# Patient Record
Sex: FEMALE | Race: WHITE | HISPANIC OR LATINO | Employment: UNEMPLOYED | URBAN - METROPOLITAN AREA
[De-identification: names, ages, dates, MRNs, and addresses within clinical notes are randomized per-mention and may not be internally consistent; named-entity substitution may affect disease eponyms.]

---

## 2017-08-27 ENCOUNTER — HOSPITAL ENCOUNTER (EMERGENCY)
Facility: HOSPITAL | Age: 32
Discharge: HOME | End: 2017-08-27
Attending: EMERGENCY MEDICINE

## 2017-08-27 VITALS
HEIGHT: 62 IN | BODY MASS INDEX: 23.92 KG/M2 | WEIGHT: 130 LBS | TEMPERATURE: 98 F | OXYGEN SATURATION: 100 % | HEART RATE: 84 BPM | SYSTOLIC BLOOD PRESSURE: 93 MMHG | RESPIRATION RATE: 14 BRPM | DIASTOLIC BLOOD PRESSURE: 62 MMHG

## 2017-08-27 DIAGNOSIS — L02.419 AXILLARY ABSCESS: Primary | ICD-10-CM

## 2017-08-27 LAB
B-HCG UR QL: NEGATIVE
CTP QC/QA: YES

## 2017-08-27 PROCEDURE — 10060 I&D ABSCESS SIMPLE/SINGLE: CPT

## 2017-08-27 PROCEDURE — 81025 URINE PREGNANCY TEST: CPT | Performed by: NURSE PRACTITIONER

## 2017-08-27 PROCEDURE — 25000003 PHARM REV CODE 250: Performed by: PHYSICIAN ASSISTANT

## 2017-08-27 PROCEDURE — 99283 EMERGENCY DEPT VISIT LOW MDM: CPT | Mod: 25

## 2017-08-27 RX ORDER — CLINDAMYCIN HYDROCHLORIDE 150 MG/1
300 CAPSULE ORAL 4 TIMES DAILY
Qty: 56 CAPSULE | Refills: 0 | Status: SHIPPED | OUTPATIENT
Start: 2017-08-27 | End: 2017-08-30 | Stop reason: ALTCHOICE

## 2017-08-27 RX ORDER — IBUPROFEN 600 MG/1
600 TABLET ORAL EVERY 6 HOURS PRN
Qty: 20 TABLET | Refills: 0 | Status: SHIPPED | OUTPATIENT
Start: 2017-08-27 | End: 2017-09-01

## 2017-08-27 RX ORDER — CLINDAMYCIN HYDROCHLORIDE 150 MG/1
300 CAPSULE ORAL
Status: COMPLETED | OUTPATIENT
Start: 2017-08-27 | End: 2017-08-27

## 2017-08-27 RX ORDER — LIDOCAINE HYDROCHLORIDE 10 MG/ML
10 INJECTION INFILTRATION; PERINEURAL
Status: COMPLETED | OUTPATIENT
Start: 2017-08-27 | End: 2017-08-27

## 2017-08-27 RX ADMIN — CLINDAMYCIN HYDROCHLORIDE 300 MG: 150 CAPSULE ORAL at 06:08

## 2017-08-27 RX ADMIN — LIDOCAINE HYDROCHLORIDE 10 ML: 10 INJECTION, SOLUTION INFILTRATION; PERINEURAL at 06:08

## 2017-08-27 NOTE — ED TRIAGE NOTES
Pt with redness, swelling to right axillary x 3 days. No active drainage. Pt denies fever. Pt's spouse at bedside with translation.

## 2017-08-27 NOTE — ED PROVIDER NOTES
Encounter Date: 8/27/2017    SCRIBE #1 NOTE: I, Judit Collins, am scribing for, and in the presence of,  Zhanna Taylor PA-C. I have scribed the following portions of the note - Other sections scribed: HPI/ROS.       History     Chief Complaint   Patient presents with    Abscess     right axilla x 4 days     CC: Abscess    HPI: This 32 y.o. Female with no pertinent medical history presents to the ED acute, severe (10/10) abscess to the right axilla for 3 days. Abscess has become larger in size and more painful since onset. No attempted tx. No alleviating or exacerbating factors. Patient denies drainage from abscess, fever, chills, nausea, vomiting, spreading redness or swelling.       The history is provided by the patient. A  was used (relative).     Review of patient's allergies indicates:   Allergen Reactions    Bactrim [sulfamethoxazole-trimethoprim] Rash     History reviewed. No pertinent past medical history.  History reviewed. No pertinent surgical history.  History reviewed. No pertinent family history.  Social History   Substance Use Topics    Smoking status: Never Smoker    Smokeless tobacco: Never Used    Alcohol use No     Review of Systems   Constitutional: Negative for chills and fever.   HENT: Negative for congestion, ear pain, rhinorrhea and sore throat.    Eyes: Negative for pain and visual disturbance.   Respiratory: Negative for cough and shortness of breath.    Cardiovascular: Negative for chest pain.   Gastrointestinal: Negative for abdominal pain, diarrhea, nausea and vomiting.   Genitourinary: Negative for dysuria.   Musculoskeletal: Negative for back pain and neck pain.   Skin: Negative for rash.        (+) Abscess   Neurological: Negative for weakness, numbness and headaches.       Physical Exam     Initial Vitals [08/27/17 1750]   BP Pulse Resp Temp SpO2   127/64 86 20 98.5 °F (36.9 °C) 100 %      MAP       85         Physical Exam    Constitutional: She  appears well-developed and well-nourished. No distress.   HENT:   Head: Normocephalic.   Right Ear: External ear normal.   Left Ear: External ear normal.   Cardiovascular: Normal rate and regular rhythm. Exam reveals no gallop and no friction rub.    No murmur heard.  Pulmonary/Chest: Breath sounds normal. No respiratory distress. She has no wheezes. She has no rhonchi. She has no rales.   Skin: Skin is warm and dry.   2 cm fluctuant abscess to R axilla. No surrounding induration, spreading erythema or edema.    Psychiatric: She has a normal mood and affect.         ED Course   I & D - Incision and Drainage  Date/Time: 8/28/2017 1:16 AM  Location procedure was performed: [facility] EMERGENCY DEPARTMENT  Performed by: LILIBETH HINDS  Authorized by: KATARZYNA BAUTISTA   Pre-operative diagnosis: right axillary abscess  Post-operative diagnosis: right axillary abscess  Consent Done: Yes  Type: abscess  Location: right axilla.  Anesthesia: local infiltration    Anesthesia:  Local Anesthetic: lidocaine 1% without epinephrine  Anesthetic total: 10 mL  Description of findings: 2 cm fluctuant R axillary abscess   Scalpel size: 10  Incision type: single straight  Complexity: simple  Drainage: purulent and  bloody  Drainage amount: scant  Wound treatment: incision,  drainage and  wound packed  Packing material: 1/4 in iodoform gauze  Complications: No  Estimated blood loss (mL): 5  Specimens: No  Implants: No  Patient tolerance: Patient tolerated the procedure well with no immediate complications        Labs Reviewed   POCT URINE PREGNANCY             Medical Decision Making:   Initial Assessment:   Patient is a 32-year-old female who presents with a 3 day history of right axillary abscess.  Patient reports history of 1 abscess in the past to left axilla.  Patient denies fever, chills, nausea, vomiting.  On exam, there is 2 cm abscess right axilla.  I&D performed per procedure note.  Patient given first dose of clindamycin  in the ED.  Considered but doubt cyst, cellulitis, folliculitis. Discharged patient home with clindamycin and instructions to follow-up with either PCP or this department in 2 days for wound check.  ER return precautions discussed including worsening symptoms, fever, worsening pain, redness, swelling or as needed.  I discussed this patient with Dr. Fuentes who agrees with assessment and plan.             Scribe Attestation:   Scribe #1: I performed the above scribed service and the documentation accurately describes the services I performed. I attest to the accuracy of the note.    Attending Attestation:     Physician Attestation Statement for NP/PA:   I discussed this assessment and plan of this patient with the NP/PA, but I did not personally examine the patient. The face to face encounter was performed by the NP/PA.        Physician Attestation for Scribe:  Physician Attestation Statement for Scribe #1: I, Zhanna Taylor PA-C, reviewed documentation, as scribed by Judit Collins in my presence, and it is both accurate and complete.                 ED Course     Clinical Impression:   The encounter diagnosis was Axillary abscess.    Disposition:   Disposition: Discharged  Condition: Stable                        Austen Fuentes MD  08/27/17 5599       hZanna Taylor PA-C  08/28/17 0119       Austen Fuentes MD  09/01/17 1743

## 2017-08-28 NOTE — DISCHARGE INSTRUCTIONS
Please keep the wound clean and dry and covered with dressing.     Follow attached instructions.   Take full course of Clindamycin (antibiotic) and Ibuprofen as needed for pain.     Follow up in 2 days with your primary care or this ER for wound recheck.     Return to ER sooner if you develop worsening symptoms, fever, spreading redness or swelling or as needed.

## 2017-08-30 ENCOUNTER — HOSPITAL ENCOUNTER (EMERGENCY)
Facility: HOSPITAL | Age: 32
Discharge: HOME | End: 2017-08-30

## 2017-08-30 VITALS
DIASTOLIC BLOOD PRESSURE: 56 MMHG | TEMPERATURE: 98 F | HEART RATE: 90 BPM | SYSTOLIC BLOOD PRESSURE: 115 MMHG | RESPIRATION RATE: 20 BRPM | OXYGEN SATURATION: 100 % | HEIGHT: 62 IN | BODY MASS INDEX: 23.92 KG/M2 | WEIGHT: 130 LBS

## 2017-08-30 DIAGNOSIS — Z51.89 WOUND CHECK, ABSCESS: Primary | ICD-10-CM

## 2017-08-30 PROCEDURE — 99283 EMERGENCY DEPT VISIT LOW MDM: CPT

## 2017-08-30 RX ORDER — DOXYCYCLINE 100 MG/1
100 CAPSULE ORAL 2 TIMES DAILY
Qty: 20 CAPSULE | Refills: 0 | Status: SHIPPED | OUTPATIENT
Start: 2017-08-30 | End: 2017-09-09

## 2017-08-30 NOTE — ED TRIAGE NOTES
Pt presents to the ED w/ family. The patient came to the ED today for a packing removal of an abscess under her right arm. Patient reports redness and tenderness under right arm near the site that started after discharge.

## 2017-08-30 NOTE — ED PROVIDER NOTES
Encounter Date: 8/30/2017       History     Chief Complaint   Patient presents with    Packing Removal Abscess     abscess to right axilla area; packed 3 days ago     33 y/o female with no medical Hx c/o right axillary pain from abscess with recent I&D 3 days ago, also presents for packing removal. She has mild pain to the incision site and notes increasing pink area to her right arm. No significant drainage or bleeding from the site. She denies fever. She was rx clindamycin, which she has been compliant with.           Review of patient's allergies indicates:   Allergen Reactions    Bactrim [sulfamethoxazole-trimethoprim] Rash     No past medical history on file.  No past surgical history on file.  History reviewed. No pertinent family history.  Social History   Substance Use Topics    Smoking status: Never Smoker    Smokeless tobacco: Never Used    Alcohol use No     Review of Systems   Constitutional: Negative for fatigue and fever.   HENT: Negative for sore throat.    Respiratory: Negative for shortness of breath.    Cardiovascular: Negative for chest pain.   Gastrointestinal: Negative for nausea.   Genitourinary: Negative for dysuria.   Musculoskeletal: Negative for arthralgias, back pain, neck pain and neck stiffness.   Skin: Positive for color change and wound. Negative for rash.   Neurological: Negative for dizziness, weakness and light-headedness.   Hematological: Negative for adenopathy. Does not bruise/bleed easily.       Physical Exam     Initial Vitals [08/30/17 1703]   BP Pulse Resp Temp SpO2   (!) 115/56 90 20 98.2 °F (36.8 °C) 100 %      MAP       75.67         Physical Exam    Vitals reviewed.  Constitutional: She appears well-developed and well-nourished. She is not diaphoretic. No distress.   HENT:   Head: Normocephalic and atraumatic.   Right Ear: External ear normal.   Left Ear: External ear normal.   Nose: Nose normal.   Eyes: Conjunctivae are normal. No scleral icterus.   Neck: Normal  range of motion. Neck supple.   Cardiovascular: Normal rate, regular rhythm, normal heart sounds and intact distal pulses.   Pulmonary/Chest: Breath sounds normal. No respiratory distress. She has no wheezes. She has no rhonchi. She has no rales. She exhibits no tenderness.   Musculoskeletal: Normal range of motion.   Neurological: She is alert and oriented to person, place, and time.   Skin: Skin is warm and dry. Abscess noted. There is erythema.   Right axillary incision with packing in place with no erythema immediately surrounding the site. There is mild erythema extending distally from the axilla to the middle of the brachial region. No induration, drainage, or bleeding.          ED Course   Procedures  Labs Reviewed - No data to display          Medical Decision Making:   Initial Assessment:   31 y/o female with recent I&D of right axillary abscess presents for wound check and packing removal. She denies fever, but c/o pain extending to her arm. She is taking clindamycin. She presents with VS within nl limits. The wound appears clean and without erythema or significant induration. There is lymphangitis extending from the right axilla to the medial brachial aspect without induration.   ED Management:  Packing removed without complication and wound dressed. I suspect this may be MRSA and resistant to clindamycin so I will switch her to doxycycline for better coverage. I do not think she needs repacking. She is not septic. She does not require IV antibiotics or admit. Pt discharged with wound care instructions and return precautions. She verbalized understanding and agreed with plan. Case discussed with Dr. Gordon.                    ED Course     Clinical Impression:   The encounter diagnosis was Wound check, abscess.                           Jose Martin Borden PA-C  08/30/17 1658

## 2018-05-02 ENCOUNTER — HOSPITAL ENCOUNTER (EMERGENCY)
Facility: HOSPITAL | Age: 33
Discharge: HOME | End: 2018-05-02
Attending: EMERGENCY MEDICINE

## 2018-05-02 VITALS
RESPIRATION RATE: 16 BRPM | TEMPERATURE: 99 F | DIASTOLIC BLOOD PRESSURE: 61 MMHG | SYSTOLIC BLOOD PRESSURE: 102 MMHG | BODY MASS INDEX: 23.55 KG/M2 | WEIGHT: 128 LBS | HEIGHT: 62 IN | HEART RATE: 74 BPM | OXYGEN SATURATION: 100 %

## 2018-05-02 DIAGNOSIS — R11.2 NON-INTRACTABLE VOMITING WITH NAUSEA, UNSPECIFIED VOMITING TYPE: ICD-10-CM

## 2018-05-02 DIAGNOSIS — R10.84 GENERALIZED ABDOMINAL PAIN: Primary | ICD-10-CM

## 2018-05-02 LAB
ALBUMIN SERPL BCP-MCNC: 3.8 G/DL
ALP SERPL-CCNC: 58 U/L
ALT SERPL W/O P-5'-P-CCNC: 10 U/L
AMORPH CRY URNS QL MICRO: NORMAL
ANION GAP SERPL CALC-SCNC: 6 MMOL/L
AST SERPL-CCNC: 13 U/L
B-HCG UR QL: NEGATIVE
BASOPHILS # BLD AUTO: 0.03 K/UL
BASOPHILS NFR BLD: 0.4 %
BILIRUB SERPL-MCNC: 0.7 MG/DL
BILIRUB UR QL STRIP: NEGATIVE
BUN SERPL-MCNC: 8 MG/DL
CALCIUM SERPL-MCNC: 8.9 MG/DL
CHLORIDE SERPL-SCNC: 105 MMOL/L
CLARITY UR: ABNORMAL
CO2 SERPL-SCNC: 25 MMOL/L
COLOR UR: YELLOW
CREAT SERPL-MCNC: 0.8 MG/DL
CTP QC/QA: YES
DIFFERENTIAL METHOD: NORMAL
EOSINOPHIL # BLD AUTO: 0 K/UL
EOSINOPHIL NFR BLD: 0.1 %
ERYTHROCYTE [DISTWIDTH] IN BLOOD BY AUTOMATED COUNT: 13.1 %
EST. GFR  (AFRICAN AMERICAN): >60 ML/MIN/1.73 M^2
EST. GFR  (NON AFRICAN AMERICAN): >60 ML/MIN/1.73 M^2
GLUCOSE SERPL-MCNC: 108 MG/DL
GLUCOSE UR QL STRIP: NEGATIVE
HCT VFR BLD AUTO: 38.8 %
HGB BLD-MCNC: 13 G/DL
HGB UR QL STRIP: NEGATIVE
KETONES UR QL STRIP: ABNORMAL
LEUKOCYTE ESTERASE UR QL STRIP: NEGATIVE
LIPASE SERPL-CCNC: 18 U/L
LYMPHOCYTES # BLD AUTO: 1.6 K/UL
LYMPHOCYTES NFR BLD: 19.9 %
MCH RBC QN AUTO: 29.3 PG
MCHC RBC AUTO-ENTMCNC: 33.5 G/DL
MCV RBC AUTO: 87 FL
MICROSCOPIC COMMENT: NORMAL
MONOCYTES # BLD AUTO: 0.9 K/UL
MONOCYTES NFR BLD: 11.4 %
NEUTROPHILS # BLD AUTO: 5.5 K/UL
NEUTROPHILS NFR BLD: 68.2 %
NITRITE UR QL STRIP: NEGATIVE
PH UR STRIP: 6 [PH] (ref 5–8)
PLATELET # BLD AUTO: 214 K/UL
PMV BLD AUTO: 11 FL
POTASSIUM SERPL-SCNC: 4 MMOL/L
PROT SERPL-MCNC: 6.9 G/DL
PROT UR QL STRIP: NEGATIVE
RBC # BLD AUTO: 4.44 M/UL
RBC #/AREA URNS HPF: 1 /HPF (ref 0–4)
SODIUM SERPL-SCNC: 136 MMOL/L
SP GR UR STRIP: 1.02 (ref 1–1.03)
SQUAMOUS #/AREA URNS HPF: 3 /HPF
URN SPEC COLLECT METH UR: ABNORMAL
UROBILINOGEN UR STRIP-ACNC: NEGATIVE EU/DL
WBC # BLD AUTO: 8.09 K/UL

## 2018-05-02 PROCEDURE — 85025 COMPLETE CBC W/AUTO DIFF WBC: CPT

## 2018-05-02 PROCEDURE — 96365 THER/PROPH/DIAG IV INF INIT: CPT

## 2018-05-02 PROCEDURE — 25000003 PHARM REV CODE 250: Performed by: PHYSICIAN ASSISTANT

## 2018-05-02 PROCEDURE — 96361 HYDRATE IV INFUSION ADD-ON: CPT

## 2018-05-02 PROCEDURE — 81025 URINE PREGNANCY TEST: CPT | Performed by: EMERGENCY MEDICINE

## 2018-05-02 PROCEDURE — 81000 URINALYSIS NONAUTO W/SCOPE: CPT

## 2018-05-02 PROCEDURE — 63600175 PHARM REV CODE 636 W HCPCS: Performed by: PHYSICIAN ASSISTANT

## 2018-05-02 PROCEDURE — 83690 ASSAY OF LIPASE: CPT

## 2018-05-02 PROCEDURE — 80053 COMPREHEN METABOLIC PANEL: CPT

## 2018-05-02 PROCEDURE — 99283 EMERGENCY DEPT VISIT LOW MDM: CPT | Mod: 25

## 2018-05-02 RX ORDER — DICYCLOMINE HYDROCHLORIDE 10 MG/1
20 CAPSULE ORAL
Status: COMPLETED | OUTPATIENT
Start: 2018-05-02 | End: 2018-05-02

## 2018-05-02 RX ORDER — PROMETHAZINE HYDROCHLORIDE 25 MG/1
25 TABLET ORAL EVERY 6 HOURS PRN
Qty: 10 TABLET | Refills: 0 | Status: SHIPPED | OUTPATIENT
Start: 2018-05-02

## 2018-05-02 RX ORDER — DICYCLOMINE HYDROCHLORIDE 20 MG/1
20 TABLET ORAL 2 TIMES DAILY
Qty: 20 TABLET | Refills: 0 | Status: SHIPPED | OUTPATIENT
Start: 2018-05-02 | End: 2018-06-01

## 2018-05-02 RX ADMIN — SODIUM CHLORIDE 1000 ML: 0.9 INJECTION, SOLUTION INTRAVENOUS at 08:05

## 2018-05-02 RX ADMIN — SODIUM CHLORIDE 1000 ML: 0.9 INJECTION, SOLUTION INTRAVENOUS at 10:05

## 2018-05-02 RX ADMIN — PROMETHAZINE HYDROCHLORIDE 25 MG: 25 INJECTION INTRAMUSCULAR; INTRAVENOUS at 08:05

## 2018-05-02 RX ADMIN — DICYCLOMINE HYDROCHLORIDE 20 MG: 10 CAPSULE ORAL at 09:05

## 2018-05-03 NOTE — ED PROVIDER NOTES
"Encounter Date: 5/2/2018    SCRIBE #1 NOTE: I, Moy Ferraro, am scribing for, and in the presence of,  Alyse Dooley PA-C. I have scribed the following portions of the note - Other sections scribed: HPI, ROS.       History     Chief Complaint   Patient presents with    Abdominal Pain     "This this morning, she got a lot of stomach pain and dizziness. She feels the body shaking, and puking."     CC: Abdominal Pain    HPI: This 32 y.o. Female presents to the ED c/o a squeezing generalized abdominal pain which began at 1000 this morning. Pt reports nausea and vomiting 5x this morning. Pt reports eating a banana and drinking Gatorade this morning. Pt says her last normal BM was this morning. Pt denies diarrhea, blood in stool, hematemesis, dysuria, vaginal discharge & bleeding, chest pain, SOB, and fever. No prior tx reported. Pt says reports having acid reflux in the past. Pt denies medical problems and regular medications. Pt reports sulfa allergy. Pt denies eating raw foods recently.      The history is provided by the patient. A  was used.     Review of patient's allergies indicates:   Allergen Reactions    Zofran [ondansetron hcl (pf)] Swelling     Facial swelling     History reviewed. No pertinent past medical history.  History reviewed. No pertinent surgical history.  History reviewed. No pertinent family history.  Social History   Substance Use Topics    Smoking status: Never Smoker    Smokeless tobacco: Never Used    Alcohol use No     Review of Systems   Constitutional: Negative for fever.   HENT: Negative for sore throat.    Respiratory: Negative for shortness of breath.    Cardiovascular: Negative for chest pain.   Gastrointestinal: Positive for abdominal pain, nausea and vomiting (5x). Negative for blood in stool and diarrhea.        (-) hematemesis   Genitourinary: Negative for dysuria, vaginal bleeding and vaginal discharge.   Musculoskeletal: Negative for back pain.   Skin: " Negative for rash.   Neurological: Negative for weakness.   Hematological: Does not bruise/bleed easily.       Physical Exam     Initial Vitals [05/02/18 1924]   BP Pulse Resp Temp SpO2   113/61 72 18 98.2 °F (36.8 °C) 100 %      MAP       78.33         Physical Exam    Nursing note and vitals reviewed.  Constitutional: Vital signs are normal. She appears well-developed and well-nourished. She is not diaphoretic. She is cooperative.  Non-toxic appearance. She does not have a sickly appearance. She does not appear ill. No distress.   HENT:   Head: Normocephalic and atraumatic.   Right Ear: Tympanic membrane, external ear and ear canal normal.   Left Ear: Tympanic membrane, external ear and ear canal normal.   Nose: Nose normal.   Mouth/Throat: Uvula is midline, oropharynx is clear and moist and mucous membranes are normal. No trismus in the jaw. No uvula swelling. No oropharyngeal exudate, posterior oropharyngeal edema or posterior oropharyngeal erythema.   Eyes: Conjunctivae, EOM and lids are normal. Pupils are equal, round, and reactive to light.   Neck: Trachea normal, normal range of motion, full passive range of motion without pain and phonation normal. Neck supple.   Cardiovascular: Normal rate, regular rhythm, normal heart sounds and intact distal pulses. Exam reveals no gallop and no friction rub.    No murmur heard.  Pulmonary/Chest: Effort normal and breath sounds normal. No respiratory distress. She has no decreased breath sounds. She has no wheezes. She has no rhonchi. She has no rales.   Abdominal: Soft. Normal appearance and bowel sounds are normal. She exhibits no distension and no mass. There is no tenderness. There is no rigidity, no rebound, no guarding, no CVA tenderness, no tenderness at McBurney's point and negative Kenney's sign.   Musculoskeletal: Normal range of motion.   Neurological: She is alert and oriented to person, place, and time. She has normal strength. No cranial nerve deficit or  sensory deficit. GCS eye subscore is 4. GCS verbal subscore is 5. GCS motor subscore is 6.   Skin: Skin is warm and dry. Capillary refill takes less than 2 seconds. No rash noted.   Psychiatric: She has a normal mood and affect. Her speech is normal and behavior is normal. Judgment and thought content normal. Cognition and memory are normal.         ED Course   Procedures  Labs Reviewed   URINALYSIS - Abnormal; Notable for the following:        Result Value    Appearance, UA Hazy (*)     Ketones, UA 2+ (*)     All other components within normal limits   COMPREHENSIVE METABOLIC PANEL - Abnormal; Notable for the following:     Anion Gap 6 (*)     All other components within normal limits   URINALYSIS MICROSCOPIC   CBC W/ AUTO DIFFERENTIAL   LIPASE   POCT URINE PREGNANCY             Medical Decision Making:   ED Management:  This is an emergent evaluation of a 32-year-old female who complains of abdominal pain. Patient reports generalized abdominal pain with associated emesis and dizziness that onset this morning around 10:00 a.m. She reports that the abdominal pain is intermittent and squeezing.  She denies any further symptoms. She reports drinking Gatorade and eating a banana today. She denies any attempted txs.     Physical Exam shows a non-toxic, afebrile, and well appearing female. Abdominal exam is benign. Normal skin tone and turgor.     Vital Signs Are Reassuring. If available, previous records reviewed.   RESULTS: UPT negative.    My overall impression is generalized abdominal pain, nausea and vomiting.   Ddx: abdominal pain, nausea, vomiting, gastroenteritis    ED Course: IV access gained, IV fluid bolus 1 L, Phenergan 25 mg IV. Upon reevaluation, patient reports still feeling weak, but denies nausea, emesis or abdominal pain. IV fluid bolus #2 given. Upon reevaluation, patient admits to relief and reports that she feels ready for discharge. Patient passed PO challenge. I feel this patient is stable for  discharge. D/C Meds:  Bentyl, Phenergan. Additional D/C Information:  I will recommend increase fluid intake, rest and close follow-up with primary care if symptoms continue. The diagnosis, treatment plan, instructions for follow-up and reevaluation with PCP, as well as ED return precautions were discussed and understanding was verbalized. All questions or concerns have been addressed. Patient was discharged home with an instructional sheet which gave not only information regarding the most likely diagnoses but also information regarding when to return to the emergency department for alarming symptoms and when to seek further care.        Other:   I have discussed this case with another health care provider.       <> Summary of the Discussion: This case was discussed with Dr. Contreras who is in agreement with my assessment and plan.             Scribe Attestation:   Scribe #1: I performed the above scribed service and the documentation accurately describes the services I performed. I attest to the accuracy of the note.    Attending Attestation:           Physician Attestation for Scribe:  Physician Attestation Statement for Scribe #1: I, Alyse Dooley PA-C, reviewed documentation, as scribed by Moy Ferraro in my presence, and it is both accurate and complete.                    Clinical Impression:   The primary encounter diagnosis was Generalized abdominal pain. A diagnosis of Non-intractable vomiting with nausea, unspecified vomiting type was also pertinent to this visit.    Disposition:   Disposition: Discharged  Condition: Stable                        Alyse Dooley PA-C  05/03/18 0331

## 2018-05-03 NOTE — DISCHARGE INSTRUCTIONS
Please make sure you are drinking lots of fluids (water/Gatorade/Powerade) and getting plenty of rest.    You were given Phenergan for nausea and Bentyl for stomach cramping/diarrhea.    Please follow-up with your primary care provider if your symptoms continue.    Return to the ER for any new or worsening symptoms.      Asegúrese de beber muchos líquidos (agua / Gatorade / Powerade) y de descansar lo suficiente.    Le dieron Phenergan para las náuseas y Bentyl para calambres / diarrea estomacales.    Realice un seguimiento con wang proveedor de atención primaria si justina síntomas continúan.    Regrese a la adolph de emergencias por cualquier síntoma nuevo o que empeore.

## 2020-12-16 ENCOUNTER — HOSPITAL ENCOUNTER (EMERGENCY)
Facility: HOSPITAL | Age: 35
Discharge: HOME | End: 2020-12-16
Attending: EMERGENCY MEDICINE

## 2020-12-16 VITALS
HEIGHT: 63 IN | TEMPERATURE: 98 F | RESPIRATION RATE: 18 BRPM | SYSTOLIC BLOOD PRESSURE: 118 MMHG | OXYGEN SATURATION: 100 % | DIASTOLIC BLOOD PRESSURE: 72 MMHG | HEART RATE: 75 BPM | WEIGHT: 135 LBS | BODY MASS INDEX: 23.92 KG/M2

## 2020-12-16 DIAGNOSIS — M79.18 MYOFASCIAL PAIN ON RIGHT SIDE: Primary | ICD-10-CM

## 2020-12-16 LAB
B-HCG UR QL: NEGATIVE
CTP QC/QA: YES

## 2020-12-16 PROCEDURE — 96372 THER/PROPH/DIAG INJ SC/IM: CPT | Mod: 59

## 2020-12-16 PROCEDURE — 63600175 PHARM REV CODE 636 W HCPCS: Performed by: PHYSICIAN ASSISTANT

## 2020-12-16 PROCEDURE — 81025 URINE PREGNANCY TEST: CPT | Performed by: EMERGENCY MEDICINE

## 2020-12-16 PROCEDURE — 99284 EMERGENCY DEPT VISIT MOD MDM: CPT | Mod: 25

## 2020-12-16 RX ORDER — PREDNISONE 20 MG/1
40 TABLET ORAL
Status: COMPLETED | OUTPATIENT
Start: 2020-12-16 | End: 2020-12-16

## 2020-12-16 RX ORDER — METHYLPREDNISOLONE 4 MG/1
TABLET ORAL
Qty: 1 PACKAGE | Refills: 0 | Status: SHIPPED | OUTPATIENT
Start: 2020-12-16 | End: 2021-01-06

## 2020-12-16 RX ORDER — ORPHENADRINE CITRATE 100 MG/1
100 TABLET, EXTENDED RELEASE ORAL 2 TIMES DAILY
Qty: 20 TABLET | Refills: 0 | Status: SHIPPED | OUTPATIENT
Start: 2020-12-16 | End: 2020-12-26

## 2020-12-16 RX ORDER — KETOROLAC TROMETHAMINE 30 MG/ML
15 INJECTION, SOLUTION INTRAMUSCULAR; INTRAVENOUS
Status: COMPLETED | OUTPATIENT
Start: 2020-12-16 | End: 2020-12-16

## 2020-12-16 RX ORDER — ORPHENADRINE CITRATE 30 MG/ML
60 INJECTION INTRAMUSCULAR; INTRAVENOUS
Status: COMPLETED | OUTPATIENT
Start: 2020-12-16 | End: 2020-12-16

## 2020-12-16 RX ADMIN — ORPHENADRINE CITRATE 60 MG: 30 INJECTION INTRAMUSCULAR; INTRAVENOUS at 01:12

## 2020-12-16 RX ADMIN — PREDNISONE 40 MG: 20 TABLET ORAL at 01:12

## 2020-12-16 RX ADMIN — KETOROLAC TROMETHAMINE 15 MG: 30 INJECTION, SOLUTION INTRAMUSCULAR at 01:12

## 2020-12-16 NOTE — ED PROVIDER NOTES
Encounter Date: 12/16/2020    SCRIBE #1 NOTE: IIris am scribing for, and in the presence of,  Jose Martin Borden PA-C. I have scribed the following portions of the note - Other sections scribed: HPI, ROS, PE.       History     Chief Complaint   Patient presents with    Neck Pain     rivas neck pain staryed x 1 week     This is a 35-year-old female with no pertinent PMHx presenting to the ED complaining of constant, radiating neck pain that onset 2 weeks ago. Patient states pain radiates down the right arm with associated intermittent numbness. Denies trauma, fever, headache, visual changes, speech changes, or weakness. Reports pain worsens with movement. She notes taking Tylenol with relief, but states symptoms return once medication wears off. States she last took medication at 4 am. Patient also reports going to a chiropractor.    The history is provided by the patient. No  was used.     Review of patient's allergies indicates:   Allergen Reactions    Zofran [ondansetron hcl (pf)] Swelling     Facial swelling     History reviewed. No pertinent past medical history.  History reviewed. No pertinent surgical history.  History reviewed. No pertinent family history.  Social History     Tobacco Use    Smoking status: Never Smoker    Smokeless tobacco: Never Used   Substance Use Topics    Alcohol use: No    Drug use: No     Review of Systems   Constitutional: Negative for fever.   HENT: Negative for voice change.    Eyes: Negative for visual disturbance.   Musculoskeletal: Positive for neck pain.   Neurological: Positive for numbness (R arm). Negative for weakness and headaches.       Physical Exam     Initial Vitals [12/16/20 1240]   BP Pulse Resp Temp SpO2   120/70 97 18 98.5 °F (36.9 °C) 100 %      MAP       --         Physical Exam    Vitals reviewed.  Constitutional: She appears well-developed and well-nourished. She is not diaphoretic. No distress.   HENT:   Head: Normocephalic and  atraumatic.   Right Ear: External ear normal.   Left Ear: External ear normal.   Nose: Nose normal.   Eyes: Conjunctivae are normal. No scleral icterus.   Neck: Muscular tenderness present. No spinous process tenderness present.       Decreased range of motion secondary to pain.  No midline tenderness.   Cardiovascular: Normal rate, regular rhythm, normal heart sounds and intact distal pulses.   Pulmonary/Chest: Breath sounds normal. No respiratory distress. She has no wheezes. She has no rhonchi. She has no rales. She exhibits no tenderness.   Abdominal: Soft. She exhibits no distension and no mass. There is no abdominal tenderness. There is no rebound and no guarding.   Lymphadenopathy:     She has no cervical adenopathy.   Neurological: She is alert and oriented to person, place, and time.   Skin: Skin is warm and dry.         ED Course   Procedures  Labs Reviewed   POCT URINE PREGNANCY          Imaging Results    None          Medical Decision Making:   ED Management:  35-year-old female with right-sided neck pain x2 weeks.  Pain worse with movement.  Tenderness to right paraspinal muscles.  No fever or focal neuro findings.  I doubt meningitis, dissection, or fracture.  Patient treated with steroid, NSAID, and muscle relaxer.  Given instructions for home care of myofascial neck pain.            Scribe Attestation:   Scribe #1: I performed the above scribed service and the documentation accurately describes the services I performed. I attest to the accuracy of the note.                      Clinical Impression:     ICD-10-CM ICD-9-CM   1. Myofascial pain on right side  M79.18 729.1                   1. Myofascial pain on right side            ED Disposition Condition    Discharge Stable        ED Prescriptions     Medication Sig Dispense Start Date End Date Auth. Provider    orphenadrine (NORFLEX) 100 mg tablet Take 1 tablet (100 mg total) by mouth 2 (two) times daily. for 10 days 20 tablet 12/16/2020 12/26/2020  Jose Martin Borden PA-C    methylPREDNISolone (MEDROL DOSEPACK) 4 mg tablet Take as directed 1 Package 12/16/2020 1/6/2021 Jose Martin Borden PA-C        Follow-up Information     Follow up With Specialties Details Why Contact Info    Azikiwe K. Lombard, MD Family Medicine Schedule an appointment as soon as possible for a visit in 1 week For follow-up care [address] [place] [address]  [phone]      [facility] Emergency Medicine Go to  If symptoms worsen [address]  [facility] [address] [phone]                          I, Jose Martin Borden, personally performed the services described in this documentation. All medical record entries made by the scribe were at my direction and in my presence.  I have reviewed the chart and agree that the record reflects my personal performance and is accurate and complete.             Jose Martin Borden PA-C  12/16/20 1513

## 2020-12-16 NOTE — ED TRIAGE NOTES
Pt. With , who reports pt. Has right side neck pain for the past 2 weeks.  denies any injures, trauma or falls.

## 2022-10-24 ENCOUNTER — HOSPITAL ENCOUNTER (EMERGENCY)
Facility: HOSPITAL | Age: 37
Discharge: HOME | End: 2022-10-24
Attending: EMERGENCY MEDICINE

## 2022-10-24 VITALS
BODY MASS INDEX: 22.49 KG/M2 | WEIGHT: 135 LBS | HEART RATE: 109 BPM | HEIGHT: 65 IN | TEMPERATURE: 99 F | DIASTOLIC BLOOD PRESSURE: 58 MMHG | OXYGEN SATURATION: 98 % | SYSTOLIC BLOOD PRESSURE: 117 MMHG | RESPIRATION RATE: 17 BRPM

## 2022-10-24 DIAGNOSIS — J10.1 INFLUENZA A: Primary | ICD-10-CM

## 2022-10-24 DIAGNOSIS — R07.9 CHEST PAIN: ICD-10-CM

## 2022-10-24 LAB
CTP QC/QA: YES
MOLECULAR STREP A: NEGATIVE
POC MOLECULAR INFLUENZA A AGN: POSITIVE
POC MOLECULAR INFLUENZA B AGN: NEGATIVE
SARS-COV-2 RDRP RESP QL NAA+PROBE: NEGATIVE

## 2022-10-24 PROCEDURE — 93010 EKG 12-LEAD: ICD-10-PCS | Mod: ,,, | Performed by: INTERNAL MEDICINE

## 2022-10-24 PROCEDURE — 99284 EMERGENCY DEPT VISIT MOD MDM: CPT

## 2022-10-24 PROCEDURE — 93010 ELECTROCARDIOGRAM REPORT: CPT | Mod: ,,, | Performed by: INTERNAL MEDICINE

## 2022-10-24 PROCEDURE — 87502 INFLUENZA DNA AMP PROBE: CPT

## 2022-10-24 PROCEDURE — 87635 SARS-COV-2 COVID-19 AMP PRB: CPT | Performed by: EMERGENCY MEDICINE

## 2022-10-24 PROCEDURE — 93005 ELECTROCARDIOGRAM TRACING: CPT

## 2022-10-24 RX ORDER — IBUPROFEN 600 MG/1
600 TABLET ORAL EVERY 6 HOURS PRN
Qty: 20 TABLET | Refills: 0 | Status: SHIPPED | OUTPATIENT
Start: 2022-10-24

## 2022-10-24 RX ORDER — OSELTAMIVIR PHOSPHATE 75 MG/1
75 CAPSULE ORAL 2 TIMES DAILY
Qty: 10 CAPSULE | Refills: 0 | Status: SHIPPED | OUTPATIENT
Start: 2022-10-24 | End: 2022-10-29

## 2022-10-24 RX ORDER — BENZONATATE 100 MG/1
100 CAPSULE ORAL 3 TIMES DAILY PRN
Qty: 20 CAPSULE | Refills: 0 | Status: SHIPPED | OUTPATIENT
Start: 2022-10-24

## 2022-10-24 RX ORDER — ACETAMINOPHEN 500 MG
500 TABLET ORAL EVERY 4 HOURS PRN
Qty: 30 TABLET | Refills: 0 | Status: SHIPPED | OUTPATIENT
Start: 2022-10-24

## 2022-10-24 NOTE — ED PROVIDER NOTES
Encounter Date: 10/24/2022    SCRIBE #1 NOTE: I, Zhanna Clarke, am scribing for, and in the presence of,  Henri Astudillo NP. I have scribed the following portions of the note - Other sections scribed: HPI, ROS.     History     Chief Complaint   Patient presents with    COVID-19 Concerns     38 yo female to triage for cough, stuffy nose, fever, sore throat, and chills x 3 days. VSS, NAD, AAOx4     Jessie Ricci is a 37 y.o. female, with no past medical history, who presents to the ED accompanied by a relative with a cough, stuffy nose, fever, sore throat, and chills that began 3 days ago. Pt also complains of chest pain when coughing. Pt uses albuterol. Pt's son recently was diagnosed with the flu. No other exacerbating or alleviating factors. Patient denies SOB, or other associated symptoms.      The history is provided by a relative. A  was used.   Review of patient's allergies indicates:   Allergen Reactions    Zofran [ondansetron hcl (pf)] Swelling     Facial swelling     History reviewed. No pertinent past medical history.  History reviewed. No pertinent surgical history.  History reviewed. No pertinent family history.  Social History     Tobacco Use    Smoking status: Never    Smokeless tobacco: Never   Substance Use Topics    Alcohol use: No    Drug use: No     Review of Systems   Constitutional:  Positive for chills and fever.   HENT:  Positive for congestion and sore throat.    Eyes:  Negative for pain.   Respiratory:  Positive for cough. Negative for shortness of breath.    Cardiovascular:  Positive for chest pain (when coughing).   Gastrointestinal:  Negative for abdominal pain and nausea.   Genitourinary:  Negative for dysuria.   Musculoskeletal:  Negative for back pain.   Skin:  Negative for rash.   Neurological:  Negative for weakness.     Physical Exam     Initial Vitals [10/24/22 1421]   BP Pulse Resp Temp SpO2   (!) 117/58 109 17 99 °F (37.2 °C) 98 %      MAP       --          Physical Exam    Nursing note and vitals reviewed.  Constitutional: She appears well-developed and well-nourished. She is not diaphoretic. No distress.   HENT:   Head: Normocephalic and atraumatic.   Right Ear: External ear normal.   Left Ear: External ear normal.   Nose: Nose normal.   Eyes: Conjunctivae and EOM are normal. Right eye exhibits no discharge. Left eye exhibits no discharge.   Neck: Neck supple. No tracheal deviation present.   Normal range of motion.  Cardiovascular:  Normal rate.           Pulmonary/Chest: No stridor. No respiratory distress.   Abdominal: Abdomen is soft. She exhibits no distension. There is no abdominal tenderness.   Musculoskeletal:         General: No tenderness. Normal range of motion.      Cervical back: Normal range of motion and neck supple.     Neurological: She is alert and oriented to person, place, and time. She has normal strength. No cranial nerve deficit or sensory deficit.   Skin: Skin is warm and dry.   Psychiatric: She has a normal mood and affect. Her behavior is normal. Judgment and thought content normal.       ED Course   Procedures  Labs Reviewed   POCT INFLUENZA A/B MOLECULAR - Abnormal; Notable for the following components:       Result Value    POC Molecular Influenza A Ag Positive (*)     All other components within normal limits   SARS-COV-2 RDRP GENE   POCT STREP A MOLECULAR   POCT URINE PREGNANCY          Imaging Results    None          Medications - No data to display  Medical Decision Making:   History:   Old Medical Records: I decided to obtain old medical records.  Clinical Tests:   Lab Tests: Ordered and Reviewed  ED Management:  DDx:  Influenza, viral syndrome, COVID-19, strep pharyngitis, viral pharyngitis, otitis media, sinusitis, pneumonia, bronchitis, meningitis, sepsis, others    HPI and physical exam as above.      The patient appears to have a viral upper respiratory infection. Positive for influenza A in the ED today.  Based upon the  history and physical exam the patient does not appear to have a serious bacterial infection such as pneumonia, sepsis, otitis media, bacterial sinusitis, strep pharyngitis, parapharyngeal or peritonsillar abscess, meningitis. COVID-9 and strep negative. Respiratory effort is normal with no signs of increased work of breathing or respiratory distress. Lungs are clear to auscultation bilaterally in all fields. Mucous membranes are moist and the patient is tolerating P.O. without difficulty.  Patient is afebrile throughout the ED course.  Patient is nontoxic, alert, active, and appears very well at this time just prior to discharge. I have given specific return precautions to the patient.  I will prescribe Tamiflu and medications to treat her symptoms.     The results and physical exam findings were reviewed with the patient. Advised them to follow up with her PCP for re-evaluation and further management.  ED return precautions given. All questions regarding diagnosis and plan were answered to the patient's fullest possible satisfaction. Patient expressed understanding of diagnosis, discharge instructions, and return precautions.        Scribe Attestation:   Scribe #1: I performed the above scribed service and the documentation accurately describes the services I performed. I attest to the accuracy of the note.                   Clinical Impression:   Final diagnoses:  [J10.1] Influenza A (Primary)  [R07.9] Chest pain      ED Disposition Condition    Discharge Stable        I, Henri Astudillo NP, personally performed the services described in this documentation. All medical record entries made by the scribe were at my direction and in my presence. I have reviewed the chart and agree that the record reflects my personal performance and is accurate and complete.   ED Prescriptions       Medication Sig Dispense Start Date End Date Auth. Provider    oseltamivir (TAMIFLU) 75 MG capsule Take 1 capsule (75 mg total) by mouth  2 (two) times daily. for 5 days 10 capsule 10/24/2022 10/29/2022 Henri Astudillo NP    acetaminophen (TYLENOL) 500 MG tablet Take 1 tablet (500 mg total) by mouth every 4 (four) hours as needed (Fever). 30 tablet 10/24/2022 -- Henri Astudillo NP    ibuprofen (ADVIL,MOTRIN) 600 MG tablet Take 1 tablet (600 mg total) by mouth every 6 (six) hours as needed for Pain. 20 tablet 10/24/2022 -- Henri Astudillo NP    benzonatate (TESSALON) 100 MG capsule Take 1 capsule (100 mg total) by mouth 3 (three) times daily as needed for Cough. 20 capsule 10/24/2022 -- Henri Astudillo NP          Follow-up Information       Follow up With Specialties Details Why Contact Info    Azikiwe K. Lombard, MD Family Medicine Schedule an appointment as soon as possible for a visit  For further evaluation [address] [place] [address]  [phone]      [facility] - Emergency Dept Emergency Medicine Go to  If symptoms worsen, As needed [address] [facility] [address] [phone]             Henri Astudillo NP  10/24/22 7719

## 2022-10-24 NOTE — DISCHARGE INSTRUCTIONS
Catrachita por venir a nuestro Departamento de Emergencias hoy. Es importante recordar que algunos problemas son difíciles de diagnosticar y es posible que no se encuentren jaleesa wang primera visita. Asegúrese de hacer un seguimiento con wang médico de atención primaria.  Si no tiene femi, puede comunicarse con el que figura en wang documentación de dianne o también puede llamar al mostrador de citas de la [facility] al [phone] para programar john roseanna con femi.    Regrese a la adolph de emergencias si tiene preguntas/inquietudes, síntomas nuevos/preocupantes, empeoramiento o falta de mejora. No conduzca ni tome decisiones importantes jaleesa 24 horas si ha recibido medicamentos para el dolor, sedantes o drogas que alteran el estado de ánimo jaleesa wang visita a la adolph de emergencias.

## 2022-10-24 NOTE — FIRST PROVIDER EVALUATION
Medical screening examination initiated.  I have conducted a focused provider triage encounter, findings are as follows:    Brief history of present illness:  flu like symptoms x 2-3 days, now has chest pain and abdominal pain when coughing    There were no vitals filed for this visit.    Pertinent physical exam:  Well hydrated, not toxic appearing, lungs clear to auscultation.  The EKG reviewed, no STEMI    Brief workup plan:  COVID, flu, strep    Preliminary workup initiated; this workup will be continued and followed by the physician or advanced practice provider that is assigned to the patient when roomed.

## 2022-10-24 NOTE — Clinical Note
"Jessie Hightower" Kamla Ricci was seen and treated in our emergency department on 10/24/2022.  She may return to work on 10/27/2022.       If you have any questions or concerns, please don't hesitate to call.      Henri Astudillo NP"

## 2022-10-24 NOTE — ED TRIAGE NOTES
Pt arrived to ed with flu like symptoms. Pt reports cough, congestion, fever, sore throat and chills x3 days. Pt reports cp with cough. States son recently diagnosed with flu.

## 2024-02-12 ENCOUNTER — HOSPITAL ENCOUNTER (EMERGENCY)
Facility: HOSPITAL | Age: 39
Discharge: HOME | End: 2024-02-12
Attending: EMERGENCY MEDICINE
Payer: COMMERCIAL

## 2024-02-12 VITALS
HEART RATE: 92 BPM | OXYGEN SATURATION: 96 % | SYSTOLIC BLOOD PRESSURE: 143 MMHG | DIASTOLIC BLOOD PRESSURE: 91 MMHG | BODY MASS INDEX: 24.8 KG/M2 | WEIGHT: 140 LBS | TEMPERATURE: 98 F | HEIGHT: 63 IN | RESPIRATION RATE: 20 BRPM
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
## 2024-02-12 LAB
B-HCG UR QL: NEGATIVE
CTP QC/QA: YES

## 2024-02-12 PROCEDURE — 99285 EMERGENCY DEPT VISIT HI MDM: CPT | Mod: 25

## 2024-02-12 PROCEDURE — 25000003 PHARM REV CODE 250: Performed by: PHYSICIAN ASSISTANT

## 2024-02-12 PROCEDURE — 81025 URINE PREGNANCY TEST: CPT | Performed by: PHYSICIAN ASSISTANT

## 2024-02-12 PROCEDURE — 96372 THER/PROPH/DIAG INJ SC/IM: CPT | Performed by: PHYSICIAN ASSISTANT

## 2024-02-12 PROCEDURE — 63600175 PHARM REV CODE 636 W HCPCS: Performed by: PHYSICIAN ASSISTANT

## 2024-02-12 RX ORDER — LIDOCAINE 50 MG/G
1 PATCH TOPICAL ONCE
Qty: 15 PATCH | Refills: 0 | Status: SHIPPED | OUTPATIENT
Start: 2024-02-12 | End: 2024-02-12

## 2024-02-12 RX ORDER — ORPHENADRINE CITRATE 30 MG/ML
30 INJECTION INTRAMUSCULAR; INTRAVENOUS
Status: COMPLETED | OUTPATIENT
Start: 2024-02-12 | End: 2024-02-12

## 2024-02-12 RX ORDER — CYCLOBENZAPRINE HCL 5 MG
5 TABLET ORAL 3 TIMES DAILY PRN
Qty: 20 TABLET | Refills: 0 | Status: SHIPPED | OUTPATIENT
Start: 2024-02-12 | End: 2024-02-22

## 2024-02-12 RX ORDER — NAPROXEN 500 MG/1
500 TABLET ORAL 2 TIMES DAILY
Qty: 20 TABLET | Refills: 0 | Status: SHIPPED | OUTPATIENT
Start: 2024-02-12

## 2024-02-12 RX ORDER — ACETAMINOPHEN 500 MG
1000 TABLET ORAL
Status: COMPLETED | OUTPATIENT
Start: 2024-02-12 | End: 2024-02-12

## 2024-02-12 RX ADMIN — ACETAMINOPHEN 1000 MG: 500 TABLET ORAL at 06:02

## 2024-02-12 RX ADMIN — ORPHENADRINE CITRATE 30 MG: 60 INJECTION INTRAMUSCULAR; INTRAVENOUS at 06:02

## 2024-02-12 NOTE — Clinical Note
"Jessie "Jessie" Kamla Ricci was seen and treated in our emergency department on 2/12/2024.  She may return to work on 02/14/2024.       If you have any questions or concerns, please don't hesitate to call.      Brandon Jacobson PA-C"
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
## 2024-02-12 NOTE — Clinical Note
"                        Jessie "Jessieyolanda Ricci was seen and treated in our emergency department on 2/12/2024.  She may return to work on 02/14/2024.  The patient may return to work without restrictions.      If you have any questions or concerns, please don't hesitate to call.      Brandon Jacobson PA-C"

## 2024-02-12 NOTE — DISCHARGE INSTRUCTIONS
Regrese al Departamento de Emergencias si presenta cualquier síntoma nuevo o que empeore, incluidos: fiebre, dolor en el pecho, dificultad para respirar, pérdida del conocimiento, mareos, debilidad o cualquier otra inquietud.    Barbara un seguimiento con wang proveedor de atención primaria dentro de la semana. Si no tiene femi, puede comunicarse con el que figura en wang documentación de dianne o también puede llamar al mostrador de citas de la [facility] al [phone] para programar john roseanna con femi.    [place] todos los medicamentos según lo recetado.

## 2024-02-12 NOTE — Clinical Note
"Jessie "Jessie" Kamla Ricci was seen and treated in our emergency department on 2/12/2024.  She may return to work on 02/14/2024.  Under no restrictions     If you have any questions or concerns, please don't hesitate to call.      Brandon Jacobson PA-C"
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
